# Patient Record
Sex: MALE
[De-identification: names, ages, dates, MRNs, and addresses within clinical notes are randomized per-mention and may not be internally consistent; named-entity substitution may affect disease eponyms.]

---

## 2018-01-10 ENCOUNTER — CHARTING TRANS (OUTPATIENT)
Dept: OTHER | Age: 33
End: 2018-01-10

## 2018-01-10 ENCOUNTER — LAB SERVICES (OUTPATIENT)
Dept: OTHER | Age: 33
End: 2018-01-10

## 2018-01-10 LAB — RAPID STREP GROUP A: NORMAL

## 2018-11-02 VITALS
DIASTOLIC BLOOD PRESSURE: 72 MMHG | SYSTOLIC BLOOD PRESSURE: 118 MMHG | HEART RATE: 98 BPM | TEMPERATURE: 98.1 F | RESPIRATION RATE: 18 BRPM

## 2024-07-21 ENCOUNTER — HOSPITAL ENCOUNTER (EMERGENCY)
Age: 39
Discharge: HOME | End: 2024-07-21
Attending: EMERGENCY MEDICINE
Payer: COMMERCIAL

## 2024-07-21 ENCOUNTER — APPOINTMENT (OUTPATIENT)
Dept: CT IMAGING | Age: 39
End: 2024-07-21
Attending: EMERGENCY MEDICINE
Payer: COMMERCIAL

## 2024-07-21 VITALS
HEART RATE: 106 BPM | RESPIRATION RATE: 18 BRPM | DIASTOLIC BLOOD PRESSURE: 99 MMHG | BODY MASS INDEX: 37.93 KG/M2 | OXYGEN SATURATION: 96 % | SYSTOLIC BLOOD PRESSURE: 138 MMHG | WEIGHT: 280 LBS | TEMPERATURE: 99 F | HEIGHT: 72 IN

## 2024-07-21 DIAGNOSIS — S09.90XA INJURY OF HEAD, INITIAL ENCOUNTER: ICD-10-CM

## 2024-07-21 DIAGNOSIS — J01.90 ACUTE SINUSITIS, RECURRENCE NOT SPECIFIED, UNSPECIFIED LOCATION: ICD-10-CM
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
## 2024-07-21 DIAGNOSIS — S01.01XA LACERATION OF SCALP, INITIAL ENCOUNTER: ICD-10-CM

## 2024-07-21 PROCEDURE — 99284 EMERGENCY DEPT VISIT MOD MDM: CPT

## 2024-07-21 PROCEDURE — 70450 CT HEAD/BRAIN W/O DYE: CPT | Performed by: EMERGENCY MEDICINE

## 2024-07-21 PROCEDURE — 93010 ELECTROCARDIOGRAM REPORT: CPT

## 2024-07-21 PROCEDURE — 12002 RPR S/N/AX/GEN/TRNK2.6-7.5CM: CPT

## 2024-07-21 PROCEDURE — 93005 ELECTROCARDIOGRAM TRACING: CPT

## 2024-07-21 RX ORDER — ACETAMINOPHEN 500 MG
1000 TABLET ORAL ONCE
Status: COMPLETED | OUTPATIENT
Start: 2024-07-21 | End: 2024-07-21

## 2024-07-21 RX ORDER — FLUTICASONE PROPIONATE 50 MCG
2 SPRAY, SUSPENSION (ML) NASAL DAILY
Qty: 16 G | Refills: 0 | Status: SHIPPED | OUTPATIENT
Start: 2024-07-21

## 2024-07-21 RX ORDER — GABAPENTIN 300 MG/1
300 CAPSULE ORAL 3 TIMES DAILY
COMMUNITY

## 2024-07-21 NOTE — ED INITIAL ASSESSMENT (HPI)
Patient to ER with c/o laceration to back of his head. Patient was walking up wooden stairs, got dizzy, missed a step and fell backwards hitting head against stair 30 minutes PTA. Denies any LOC, no N/V. No pain medications taken PTA. Patient with c/o chest pressure for the past two weeks intermittently with intermittent dizziness.

## 2024-07-21 NOTE — ED PROVIDER NOTES
Patient Seen in: [facility] In [place]      History     Chief Complaint   Patient presents with    Laceration/Abrasion     Stated Complaint: Laceration to back of head, fell walking up the stairs    Subjective:   39-year-old male presents emergency room after fall.  Patient reports he got lightheaded and dizzy and fell.  Patient struck the back of his head he did not lose consciousness he called EMS who wrapped the wound.  He did then refused transport.  Patient reports no blood thinners his GCS is 15 he has no focal deficits on exam.  The wound edges are present but no active bleeding.  He has approximately 3 cm wound on the posterior scalp.  Patient reports he is up-to-date with tetanus shot    The history is provided by the patient.           Objective:   Past Medical History:    NIYA (obstructive sleep apnea)    AHI-36    Sepsis (HCC)              Past Surgical History:   Procedure Laterality Date    Toe surgery Right                 Social History     Socioeconomic History    Marital status:    Tobacco Use    Smoking status: Never    Smokeless tobacco: Never   Vaping Use    Vaping status: Never Used   Substance and Sexual Activity    Alcohol use: Not Currently     Comment: occ    Drug use: Never     Social Determinants of Health      Received from [facility]    Social Connections    Received from [facility]              Review of Systems   Skin:  Positive for wound.   Neurological:  Positive for dizziness.       Positive for stated Chief Complaint: Laceration/Abrasion    Other systems are as noted in HPI.  Constitutional and vital signs reviewed.      All other systems reviewed and negative except as noted above.    Physical Exam     ED Triage Vitals [07/21/24 0316]   BP (!) 147/102   Pulse 116   Resp 18   Temp 98.6 °F (37 °C)   Temp src Oral   SpO2 95 %   O2 Device None (Room air)       Current Vitals:   Vital Signs  BP: (!) 147/102  Pulse:  116  Resp: 18  Temp: 98.6 °F (37 °C)  Temp src: Oral    Oxygen Therapy  SpO2: 95 %  O2 Device: None (Room air)            Physical Exam  Vitals and nursing note reviewed.   Constitutional:       General: He is not in acute distress.     Appearance: Normal appearance. He is obese. He is not toxic-appearing.   HENT:      Head: Normocephalic.      Comments: 3 cm scalp laceration no active bleeding  Eyes:      Extraocular Movements: Extraocular movements intact.      Pupils: Pupils are equal, round, and reactive to light.   Cardiovascular:      Rate and Rhythm: Regular rhythm. Tachycardia present.   Pulmonary:      Effort: Pulmonary effort is normal.   Abdominal:      General: There is no distension.      Palpations: Abdomen is soft.      Tenderness: There is no abdominal tenderness.   Musculoskeletal:         General: Normal range of motion.   Skin:     General: Skin is warm.      Capillary Refill: Capillary refill takes less than 2 seconds.      Comments: Scalp laceration no active bleeding   Neurological:      General: No focal deficit present.      Mental Status: He is alert and oriented to person, place, and time.   Psychiatric:         Mood and Affect: Mood normal.         Behavior: Behavior normal.               ED Course   Labs Reviewed - No data to display  EKG    Rate, intervals and axes as noted on EKG Report.  Rate: 112  Rhythm: Sinus Rhythm  Reading: Sinus tachycardia no ST elevation AL interval 184 QRS of 90 QTc of 455 with axes of 51/68/14         Laceration repair  The wound was not anesthetized, as patient declined anesthetic. The wound was irrigated with normal saline. The wound was prepped and draped in the normal sterile fashion.  The wound was explored for foreign bodies and none were found. The edges were reapproximated using 4 staples suture,    wound length was approximated at 3 cm.  Patient tolerated procedure well.  Bacitracin dressing was applied.                CT brain shows no priors no  acute intracranial hemorrhage midline shift or mass effect no acute fracture sinuses in the maxillary and sphenoid sinuses and ethmoid air cells bilaterally.  Subtotal ossification of the right maxillary sinus likely representing a mucous retention cyst.  Frontal sinuses appear normal.         MDM      Social -negative tobacco, negative etoh, negative drugs  Family History-noncontributory  Past Medical History-sleep apnea    Differential diagnosis before testing included head injury, laceration, intracranial hemorrhage, fracture, dizziness, sinus issues,    Co-morbidities that add to the complexity of management include: History of sleep apnea    Testing ordered during this visit included CT of the brain EKG    Radiographic images  I personally reviewed the radiographs and my individual interpretation shows no acute process  I also reviewed the official reports that showed CT brain shows no priors no acute intracranial hemorrhage midline shift or mass effect no acute fracture sinuses in the maxillary and sphenoid sinuses and ethmoid air cells bilaterally.  Subtotal ossification of the right maxillary sinus likely representing a mucous retention cyst.  Frontal sinuses appear normal.    External chart review showed review of care everywhere in epic system shows no related comorbidities to current presentation    History obtained by an independent source included from patient    Discussion of management with patient    Social determinants of health that affect care include no listed primary care physician      Medications Provided: Bacitracin, Tylenol    Course of Events during Emergency Room Visit include 39-year-old male with fall at home with head injury patient's scalp laceration injury.  He is CT scan shows no acute intracranial hemorrhage or fracture.  Bacitracin was placed he is to have stitches out in 5 to 7 days.  Is also was found to have sinusitis on imaging will recommend Flonase nasal spray and follow-up  with primary care physician          Disposition:      Discharge  I have discussed with the patient the results of test, differential diagnosis, treatment plan, warning signs and symptoms which should prompt immediate return.  They expressed understanding of these instructions and agrees to the following plan provided.  They were given written discharge instructions and agrees to return for any concerns and voiced understanding and all questions were answered.                                      Medical Decision Making      Disposition and Plan     Clinical Impression:  1. Fall, initial encounter    2. Injury of head, initial encounter    3. Laceration of scalp, initial encounter    4. Acute sinusitis, recurrence not specified, unspecified location         Disposition:  Discharge  7/21/2024  4:06 am    Follow-up:  Selena Powell MD  [address] [facility] [address]  [phone]    Schedule an appointment as soon as possible for a visit            Medications Prescribed:  Current Discharge Medication List        START taking these medications    Details   bacitracin 500 UNIT/GM External Ointment Apply 1 Application topically 2 (two) times daily for 10 days.  Qty: 15 g, Refills: 0      fluticasone propionate 50 MCG/ACT Nasal Suspension 2 sprays by Nasal route daily.  Qty: 16 g, Refills: 0

## 2024-07-21 NOTE — ED QUICK NOTES
Patient originally called 911 but refused transfer once EMS came to his house. Dressing to patient's head was placed by EMS.

## 2024-07-22 LAB
ATRIAL RATE: 112 BPM
P AXIS: 51 DEGREES
P-R INTERVAL: 184 MS
Q-T INTERVAL: 334 MS
QRS DURATION: 90 MS
QTC CALCULATION (BEZET): 455 MS
R AXIS: 68 DEGREES
T AXIS: 14 DEGREES
VENTRICULAR RATE: 112 BPM

## 2024-07-26 ENCOUNTER — HOSPITAL ENCOUNTER (EMERGENCY)
Age: 39
Discharge: HOME | End: 2024-07-26
Payer: COMMERCIAL

## 2024-07-26 VITALS
SYSTOLIC BLOOD PRESSURE: 118 MMHG | OXYGEN SATURATION: 95 % | TEMPERATURE: 99 F | BODY MASS INDEX: 37.93 KG/M2 | HEART RATE: 94 BPM | DIASTOLIC BLOOD PRESSURE: 78 MMHG | RESPIRATION RATE: 16 BRPM | WEIGHT: 280 LBS | HEIGHT: 72 IN

## 2024-07-26 DIAGNOSIS — S01.01XD LACERATION OF SCALP, SUBSEQUENT ENCOUNTER: Primary | ICD-10-CM

## 2024-07-26 DIAGNOSIS — Z48.02 ENCOUNTER FOR REMOVAL OF SUTURES: ICD-10-CM

## 2024-07-26 NOTE — ED PROVIDER NOTES
Patient Seen in: [facility] In [place]      History     Chief Complaint   Patient presents with    Sut Stap RingRemoval     Stated Complaint: staple removal on head.    Subjective:   HPI    CHIEF COMPLAINT: Staple removal     HISTORY OF PRESENT ILLNESS: Patient is a 39-year-old male presenting for staple removal.  He had 4 staples put in the posterior scalp 6 days ago.  States he has been feeling well.  No headaches, nausea, vomiting, dizziness or vision change since his visit here.  No fever or chills.     REVIEW OF SYSTEMS:  Constitutional: no fever, no chills  Eyes: no discharge  ENT: no sore throat  Cardiovascular: no chest pain, no palpitations  Respiratory: no cough, no shortness of breath  Gastrointestinal: no abdominal pain, no vomiting  Genitourinary: no hematuria  Musculoskeletal: no back pain  Skin: no rashes  Neurological: no headache     Otherwise a complete review of systems was obtained and other than the HPI was negative     The patient's medication list, past medical history and social history elements is as listed in today's nurse's notes are reviewed and agree. The patient's family history is reviewed and is noncontributory to the presenting problem, except as indicated as above.    Objective:   Past Medical History:    NIYA (obstructive sleep apnea)    AHI-36    Sepsis (HCC)              Past Surgical History:   Procedure Laterality Date    Toe surgery Right                 Social History     Socioeconomic History    Marital status:    Tobacco Use    Smoking status: Never    Smokeless tobacco: Never   Vaping Use    Vaping status: Never Used   Substance and Sexual Activity    Alcohol use: Not Currently     Comment: occ    Drug use: Never     Social Determinants of Health      Received from [facility]    Social Connections    Received from [facility]              Review of Systems    Positive for stated Chief Complaint: Sut Stap  RingRemoval    Other systems are as noted in HPI.  Constitutional and vital signs reviewed.      All other systems reviewed and negative except as noted above.    Physical Exam     ED Triage Vitals [07/26/24 1547]   /78   Pulse 94   Resp 16   Temp 98.5 °F (36.9 °C)   Temp src Temporal   SpO2 95 %   O2 Device None (Room air)       Current Vitals:   Vital Signs  BP: 118/78  Pulse: 94  Resp: 16  Temp: 98.5 °F (36.9 °C)  Temp src: Temporal    Oxygen Therapy  SpO2: 95 %  O2 Device: None (Room air)            Physical Exam    Vital signs and nursing notes reviewed  General Appearance: No acute distress  Neurological:  A&Ox3,  Gait normal.  Psychiatric: calm and cooperative  Respiratory: Normal effort  Musculoskeletal: Extremities are symmetrical, full range of motion  Skin:  warm and dry, no rashes.   Posterior scalp there is a well-healed linear laceration with 4 staples in place.  No surrounding warmth, erythema, induration.  No active bleeding.  4 staples removed without difficulty.  Patient tolerated well.    ED Course   Labs Reviewed - No data to display                   MDM      This is a well-appearing 39-year-old male presenting for staple removal.  4 staples removed from the posterior scalp without difficulty.  Patient tolerated well.  Follow-up as needed.  He voiced understanding the treatment plan.  All questions answered                                   [facility]    Disposition and Plan     Clinical Impression:  1. Laceration of scalp, subsequent encounter    2. Encounter for removal of sutures         Disposition:  Discharge  7/26/2024  4:20 pm    Follow-up:  Your primary care provider    Follow up  As needed          Medications Prescribed:  Discharge Medication List as of 7/26/2024  4:21 PM

## 2025-04-09 RX ORDER — MONTELUKAST SODIUM 10 MG/1
TABLET ORAL
COMMUNITY

## 2025-04-09 RX ORDER — ALBUTEROL SULFATE 0.83 MG/ML
SOLUTION RESPIRATORY (INHALATION)
COMMUNITY

## 2025-04-10 ENCOUNTER — OFFICE VISIT (OUTPATIENT)
Dept: FAMILY MEDICINE CLINIC | Facility: CLINIC | Age: 40
End: 2025-04-10
Payer: COMMERCIAL

## 2025-04-10 ENCOUNTER — HOSPITAL ENCOUNTER (OUTPATIENT)
Dept: GENERAL RADIOLOGY | Age: 40
Discharge: HOME | End: 2025-04-10
Attending: FAMILY MEDICINE
Payer: COMMERCIAL

## 2025-04-10 VITALS
BODY MASS INDEX: 39.14 KG/M2 | DIASTOLIC BLOOD PRESSURE: 76 MMHG | RESPIRATION RATE: 18 BRPM | WEIGHT: 289 LBS | SYSTOLIC BLOOD PRESSURE: 118 MMHG | OXYGEN SATURATION: 94 % | HEIGHT: 72 IN | HEART RATE: 110 BPM

## 2025-04-10 DIAGNOSIS — M79.672 BILATERAL FOOT PAIN: ICD-10-CM

## 2025-04-10 DIAGNOSIS — Z80.9 FAMILY HISTORY OF CANCER: ICD-10-CM

## 2025-04-10 DIAGNOSIS — Z00.00 LABORATORY EXAM ORDERED AS PART OF ROUTINE GENERAL MEDICAL EXAMINATION: ICD-10-CM

## 2025-04-10 DIAGNOSIS — G47.33 OSA (OBSTRUCTIVE SLEEP APNEA): ICD-10-CM

## 2025-04-10 DIAGNOSIS — M25.50 ARTHRALGIA, UNSPECIFIED JOINT: ICD-10-CM

## 2025-04-10 DIAGNOSIS — M79.671 BILATERAL FOOT PAIN: ICD-10-CM

## 2025-04-10 DIAGNOSIS — G57.12 MERALGIA PARAESTHETICA, LEFT: Primary | ICD-10-CM

## 2025-04-10 DIAGNOSIS — M51.369 DEGENERATION OF INTERVERTEBRAL DISC OF LUMBAR REGION, UNSPECIFIED WHETHER PAIN PRESENT: ICD-10-CM

## 2025-04-10 DIAGNOSIS — R53.83 FATIGUE, UNSPECIFIED TYPE: ICD-10-CM

## 2025-04-10 PROCEDURE — 3078F DIAST BP <80 MM HG: CPT | Performed by: FAMILY MEDICINE

## 2025-04-10 PROCEDURE — 3074F SYST BP LT 130 MM HG: CPT | Performed by: FAMILY MEDICINE

## 2025-04-10 PROCEDURE — 73630 X-RAY EXAM OF FOOT: CPT | Performed by: FAMILY MEDICINE

## 2025-04-10 PROCEDURE — 3008F BODY MASS INDEX DOCD: CPT | Performed by: FAMILY MEDICINE

## 2025-04-10 PROCEDURE — 99204 OFFICE O/P NEW MOD 45 MIN: CPT | Performed by: FAMILY MEDICINE

## 2025-04-10 RX ORDER — PREGABALIN 75 MG/1
75 CAPSULE ORAL 2 TIMES DAILY
Qty: 60 CAPSULE | Refills: 2 | Status: SHIPPED | OUTPATIENT
Start: 2025-04-10

## 2025-04-10 NOTE — PROGRESS NOTES
The following individual(s) verbally consented to be recorded using ambient AI listening technology and understand that they can each withdraw their consent to this listening technology at any point by asking the clinician to turn off or pause the recording:    Patient name: Paddy Piña Jr.  Additional names:
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
## 2025-04-11 ENCOUNTER — LAB ENCOUNTER (OUTPATIENT)
Dept: LAB | Age: 40
End: 2025-04-11
Attending: FAMILY MEDICINE
Payer: COMMERCIAL

## 2025-04-11 DIAGNOSIS — Z00.00 LABORATORY EXAM ORDERED AS PART OF ROUTINE GENERAL MEDICAL EXAMINATION: ICD-10-CM

## 2025-04-11 DIAGNOSIS — R53.83 FATIGUE, UNSPECIFIED TYPE: ICD-10-CM

## 2025-04-11 DIAGNOSIS — M25.50 ARTHRALGIA, UNSPECIFIED JOINT: ICD-10-CM

## 2025-04-11 LAB
CHOLEST SERPL-MCNC: 172 MG/DL (ref ?–200)
CRP SERPL-MCNC: <0.4 MG/DL (ref ?–0.5)
ERYTHROCYTE [SEDIMENTATION RATE] IN BLOOD: 7 MM/HR (ref 0–15)
FASTING PATIENT LIPID ANSWER: YES
HDLC SERPL-MCNC: 42 MG/DL (ref 40–59)
LDLC SERPL CALC-MCNC: 104 MG/DL (ref ?–100)
NONHDLC SERPL-MCNC: 130 MG/DL (ref ?–130)
RHEUMATOID FACT SERPL-ACNC: <3.5 IU/ML (ref ?–14)
TRIGL SERPL-MCNC: 148 MG/DL (ref 30–149)
TSI SER-ACNC: 1.94 UIU/ML (ref 0.55–4.78)
URATE SERPL-MCNC: 5.4 MG/DL (ref 3.7–9.2)
VIT B12 SERPL-MCNC: 362 PG/ML (ref 211–911)
VIT D+METAB SERPL-MCNC: 8.8 NG/ML (ref 30–100)
VLDLC SERPL CALC-MCNC: 25 MG/DL (ref 0–30)

## 2025-04-11 PROCEDURE — 85652 RBC SED RATE AUTOMATED: CPT

## 2025-04-11 PROCEDURE — 82306 VITAMIN D 25 HYDROXY: CPT

## 2025-04-11 PROCEDURE — 84550 ASSAY OF BLOOD/URIC ACID: CPT

## 2025-04-11 PROCEDURE — 86200 CCP ANTIBODY: CPT

## 2025-04-11 PROCEDURE — 84443 ASSAY THYROID STIM HORMONE: CPT

## 2025-04-11 PROCEDURE — 86225 DNA ANTIBODY NATIVE: CPT

## 2025-04-11 PROCEDURE — 36415 COLL VENOUS BLD VENIPUNCTURE: CPT

## 2025-04-11 PROCEDURE — 86140 C-REACTIVE PROTEIN: CPT

## 2025-04-11 PROCEDURE — 84403 ASSAY OF TOTAL TESTOSTERONE: CPT

## 2025-04-11 PROCEDURE — 86038 ANTINUCLEAR ANTIBODIES: CPT

## 2025-04-11 PROCEDURE — 84402 ASSAY OF FREE TESTOSTERONE: CPT

## 2025-04-11 PROCEDURE — 80061 LIPID PANEL: CPT

## 2025-04-11 PROCEDURE — 86431 RHEUMATOID FACTOR QUANT: CPT

## 2025-04-11 PROCEDURE — 82607 VITAMIN B-12: CPT

## 2025-04-14 LAB
CCP IGG SERPL-ACNC: 1.2 U/ML (ref 0–6.9)
DSDNA IGG SERPL IA-ACNC: 0.7 IU/ML (ref ?–10)
ENA AB SER QL IA: 0.3 UG/L (ref ?–0.7)
ENA AB SER QL IA: NEGATIVE

## 2025-04-15 DIAGNOSIS — E55.9 VITAMIN D DEFICIENCY: Primary | ICD-10-CM

## 2025-04-15 DIAGNOSIS — R53.83 FATIGUE, UNSPECIFIED TYPE: ICD-10-CM

## 2025-04-15 RX ORDER — ERGOCALCIFEROL 1.25 MG/1
50000 CAPSULE, LIQUID FILLED ORAL WEEKLY
Qty: 4 CAPSULE | Refills: 1 | Status: SHIPPED | OUTPATIENT
Start: 2025-04-15 | End: 2025-06-10

## 2025-04-16 LAB
FREE TESTOST DIRECT: 10.3 PG/ML
TESTOSTERONE: 384 NG/DL

## 2025-05-07 ENCOUNTER — HOSPITAL ENCOUNTER (EMERGENCY)
Age: 40
Discharge: HOME | End: 2025-05-07
Payer: COMMERCIAL

## 2025-05-07 VITALS
HEIGHT: 72 IN | WEIGHT: 280 LBS | BODY MASS INDEX: 37.93 KG/M2 | RESPIRATION RATE: 18 BRPM | DIASTOLIC BLOOD PRESSURE: 88 MMHG | TEMPERATURE: 98 F | HEART RATE: 84 BPM | OXYGEN SATURATION: 96 % | SYSTOLIC BLOOD PRESSURE: 116 MMHG

## 2025-05-07 DIAGNOSIS — H43.391 VITREOUS FLOATERS OF RIGHT EYE: Primary | ICD-10-CM

## 2025-05-07 PROCEDURE — 99283 EMERGENCY DEPT VISIT LOW MDM: CPT

## 2025-05-07 NOTE — ED PROVIDER NOTES
Patient Seen in: [facility] In [place]      History     Chief Complaint   Patient presents with    Eye Visual Problem     Pt endorses R eye blurry vision and floaters since yesterday, denies injury, denies any other symptoms.      Stated Complaint: Blurry vision    Subjective:   HPI    Patient complains of black \"spider web\" in his right field of vision since yesterday that has been persistent.  States he initially assumed it was related to his contacts so he removed them with no improvement.  Denies eye pain, drainage from eye or any injury/trauma.  Denies flashes of light or loss of vision.  Denies any other complaints/concerns at this time .      History of Present Illness               Objective:     Past Medical History:    NIYA (obstructive sleep apnea)    AHI-36    Sepsis (HCC)              Past Surgical History:   Procedure Laterality Date    Toe surgery Right                 Social History     Socioeconomic History    Marital status:    Tobacco Use    Smoking status: Never    Smokeless tobacco: Never   Vaping Use    Vaping status: Never Used   Substance and Sexual Activity    Alcohol use: Never    Drug use: Never     Social Drivers of Health     Food Insecurity: No Food Insecurity (4/10/2025)    NCSS - Food Insecurity     Worried About Running Out of Food in the Last Year: No     Ran Out of Food in the Last Year: No   Transportation Needs: No Transportation Needs (4/10/2025)    NCSS - Transportation     Lack of Transportation: No   Housing Stability: Not At Risk (4/10/2025)    NCSS - Housing/Utilities     Has Housing: Yes     Worried About Losing Housing: No     Unable to Get Utilities: No                                Physical Exam     ED Triage Vitals [05/07/25 0940]   /88   Pulse 84   Resp 18   Temp 98.4 °F (36.9 °C)   Temp src Oral   SpO2 96 %   O2 Device None (Room air)       Current Vitals:   Vital Signs  BP: 116/88  Pulse: 84  Resp: 18  Temp: 98.4 °F (36.9 °C)  Temp  src: Oral    Oxygen Therapy  SpO2: 96 %  O2 Device: None (Room air)      Right Eye Chart Acuity: 20/25, Corrected  Left Eye Chart Acuity: 20/25, Corrected  Physical Exam  Vitals and nursing note reviewed.   Constitutional:       Appearance: Normal appearance.   HENT:      Head: Normocephalic.   Eyes:      General: Lids are everted, no foreign bodies appreciated. Vision grossly intact.      Extraocular Movements: Extraocular movements intact.      Conjunctiva/sclera: Conjunctivae normal.      Right eye: Right conjunctiva is not injected. No chemosis, exudate or hemorrhage.     Pupils: Pupils are equal, round, and reactive to light.      Right eye: No corneal abrasion or fluorescein uptake. Doreen exam negative.      Funduscopic exam:     Right eye: Red reflex present.         Left eye: Red reflex present.     Comments: IOP: 20   Pulmonary:      Effort: Pulmonary effort is normal.   Musculoskeletal:         General: Normal range of motion.   Skin:     General: Skin is warm and dry.   Neurological:      General: No focal deficit present.      Mental Status: He is alert.           Physical Exam                ED Course   Labs Reviewed - No data to display       Results                           MDM      Differential diagnosis includes but is not limited to floaters, retinal detachment, glaucoma, corneal abrasion.    Patient well-appearing, non-toxic. Visual acuity WNL.  Red light reflex intact on exam and no reports of visual field loss.  The eye was stained and there was no fluoroscein uptake.  IOP measured and was 20.  Patient has no associated eye pain.  Discussed differential diagnosis with patient and likely this is benign but advised close follow-up with ophthalmology, referral provided.  I advised he call them today.  Provided return precautions.  Patient verbalized understanding/agreement of plan.         Medical Decision Making      Disposition and Plan     Clinical Impression:  1. Vitreous floaters of right  eye         Disposition:  Discharge  5/7/2025 10:21 am    Follow-up:  Bert Bojorquez MD  [address] [address]  [phone]    Call today            Medications Prescribed:  Discharge Medication List as of 5/7/2025 10:22 AM          Supplementary Documentation:

## 2025-05-07 NOTE — ED INITIAL ASSESSMENT (HPI)
Pt endorses R eye blurry vision and floaters since yesterday, denies injury, denies any other symptoms.

## 2025-05-14 ENCOUNTER — TELEPHONE (OUTPATIENT)
Facility: CLINIC | Age: 40
End: 2025-05-14

## 2025-05-14 ENCOUNTER — TELEPHONE (OUTPATIENT)
Dept: PHYSICAL THERAPY | Facility: HOSPITAL | Age: 40
End: 2025-05-14

## 2025-05-14 ENCOUNTER — OFFICE VISIT (OUTPATIENT)
Facility: LOCATION | Age: 40
End: 2025-05-14
Payer: COMMERCIAL

## 2025-05-14 ENCOUNTER — OFFICE VISIT (OUTPATIENT)
Dept: PHYSICAL THERAPY | Age: 40
End: 2025-05-14
Attending: FAMILY MEDICINE
Payer: COMMERCIAL

## 2025-05-14 DIAGNOSIS — M20.5X1 HALLUX LIMITUS OF RIGHT FOOT: Primary | ICD-10-CM

## 2025-05-14 DIAGNOSIS — M25.561 RIGHT KNEE PAIN, UNSPECIFIED CHRONICITY: Primary | ICD-10-CM

## 2025-05-14 DIAGNOSIS — M79.672 LEFT FOOT PAIN: ICD-10-CM

## 2025-05-14 DIAGNOSIS — G57.12 MERALGIA PARAESTHETICA, LEFT: Primary | ICD-10-CM

## 2025-05-14 DIAGNOSIS — M19.071 ARTHRITIS OF RIGHT SUBTALAR JOINT: ICD-10-CM

## 2025-05-14 DIAGNOSIS — M20.5X2 HALLUX LIMITUS OF LEFT FOOT: ICD-10-CM

## 2025-05-14 DIAGNOSIS — M25.571 SINUS TARSITIS OF RIGHT FOOT: ICD-10-CM

## 2025-05-14 DIAGNOSIS — M19.071 ARTHRITIS OF RIGHT MIDFOOT: ICD-10-CM

## 2025-05-14 DIAGNOSIS — M79.671 RIGHT FOOT PAIN: ICD-10-CM

## 2025-05-14 DIAGNOSIS — Z89.421 HISTORY OF AMPUTATION OF LESSER TOE OF RIGHT FOOT (HCC): ICD-10-CM

## 2025-05-14 PROCEDURE — 97161 PT EVAL LOW COMPLEX 20 MIN: CPT

## 2025-05-14 PROCEDURE — 97110 THERAPEUTIC EXERCISES: CPT

## 2025-05-14 PROCEDURE — 99203 OFFICE O/P NEW LOW 30 MIN: CPT | Performed by: PODIATRIST

## 2025-05-14 RX ORDER — METHYLPREDNISOLONE 4 MG/1
TABLET ORAL
Qty: 21 TABLET | Refills: 0 | Status: SHIPPED | OUTPATIENT
Start: 2025-05-14

## 2025-05-14 NOTE — TELEPHONE ENCOUNTER
Future Appointments   Date Time Provider Department Center   5/14/2025  3:00 PM Furto, Raphael, PT [facility]   5/19/2025  5:15 PM Furto, Raphael, PT [facility]   5/20/2025  9:30 AM Carmelo Masterson MD [facility] [address]   5/21/2025  5:15 PM Furto, Raphael, PT [facility] [place]   5/22/2025  8:40 AM Bear Gray MD [facility]   5/27/2025  2:15 PM Furto, Raphael, PT [facility] [place]   5/30/2025  1:30 PM Furto, Raphael, PT [facility] [place]   6/2/2025  6:00 PM Furto, Raphael, PT [facility] [place]   6/4/2025  9:30 AM Maria Guadalupe Lezama PF [facility]   6/4/2025 10:50 AM PF OOT PF [facility]   6/6/2025  9:15 AM Darius Gutierrez DPM [id number] [facility]   6/20/2025 10:00 PM SCHEDULE BY DATE AMADOU Kevin     Please advise if patient needs right knee xrays.  Patient already knows to come early,so no need to call him.

## 2025-05-14 NOTE — PROGRESS NOTES
[facility]  Progress Note      Paddy Piña Jr. is a 40 year old male.   Chief Complaint   Patient presents with    Foot Pain     Consult bilateral foot pain 4/10, right foot is worse than left. Hx of right 2nd toe bony amputation and recently Ddiagnose with bilateral osteoarthritis at 1st. Has XR in epic.         HPI:     The following individual(s) verbally consented to be recorded using ambient AI listening technology and understand that they can each withdraw their consent to this listening technology at any point by asking the clinician to turn off or pause the recording:      History of Present Illness  Paddy Piña Jr. is a 40 year old male with a history of right 2nd toe amputation who presents with foot pain and suspected arthritis.    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 The surgery was complicated by sepsis, affecting his recovery. He now experiences persistent pain in the joints of both great toes, with a sensation of cracking, particularly exacerbated by driving and weight-bearing activities.  Patient also experiences overall pain to his feet, particularly the right foot.    The pain is primarily localized to the midfoot of the right foot and does not radiate. It is significantly aggravated by activities such as transitioning from sitting to standing and playing with his daughter, driving, and is impacting his daily life. Currently, he is not taking any medication for the pain.    His occupation as a  involves extensive driving, which he believes contributes to the pain in his right foot. He regularly drives long distances, which exacerbates the discomfort.    He has a history of playing sports such as football and wrestling, which may have contributed to joint issues. Although he reports no known injuries from these activities, he acknowledges the possibility of past  joint damage.  Patient also shares that he is having right knee issues and is inquiring any recommendations on physicians to see for this.      Allergies: Dog epithelium and Molds & smuts   Current Medications[1]   Past Medical History[2]   Past Surgical History[3]   Family History[4]   Social Hx on file[5]        REVIEW OF SYSTEMS:     10 point ROS completed and was negative unless stated in HPI.      EXAM:     GENERAL: well developed, well nourished, in no apparent distress  EXTREMITIES:  1. Integument: Skin appears moist, warm, and supple. There are no color changes. No open lesions. No macerations. No Hyperkeratotic lesions.  Healed cicatrix with syndactylization of remaining right second digit tissue to the right third digit.  No signs of infection or complication.  2. Vascular: Dorsalis pedis 2/4 bilateral and posterior tibial pulses 2/4 bilateral, capillary refill normal.  3. Neurological: Gross sensation intact via light touch bilaterally.  Normal sharp/dull sensation  4. Musculoskeletal: History of right second digit amputation with soft tissue envelope still within the foot, some dactylitis to the right third digit.  Patient does have decreased range of motion of bilateral first MPJs with pain at end range of dorsiflexion.  Moderate pain with palpation to second tarsometatarsal joint with palpable exostosis present, right foot.  Patient does have adequate inversion of subtalar joint of the right foot, but limitations with eversion and pain elicited.  There is also pain with palpation within the sinus tarsi of the right foot.  No pain with palpation to tarsometatarsal joints of the left foot and no pain to the subtalar joint or left foot.    XR FOOT, COMPLETE (MIN 3 VIEWS), BILAT (CPT=73630-50)  Result Date: 4/10/2025  CONCLUSION:  1. Prior bony amputation right 2nd digit. 2. Bilateral osteoarthritis at 1st metatarsophalangeal joints.   LOCATION:  [facility]   Dictated by (CST): Chivo Salinas MD on 4/10/2025 at  3:50 PM     Finalized by (CST): Chivo Salinas MD on 4/10/2025 at 3:52 PM          ASSESSMENT AND PLAN:   Diagnoses and all orders for this visit:    Hallux limitus of right foot    Hallux limitus of left foot    Arthritis of right midfoot    Arthritis of right subtalar joint    Sinus tarsitis of right foot    Right foot pain    Left foot pain    History of amputation of lesser toe of right foot (HCC)        Plan:   -Patient was seen and evaluated today in clinic.  Chart history reviewed.    Assessment & Plan  Arthritis in feet and right knee  Chronic foot pain with mild arthritic changes in midfoot and subtalar joint, exacerbated by driving and weight-bearing. Discussed supportive footwear and inserts. Opted for oral steroids over injections. Potential for worsening arthritis due to occupational demands.  - Recommend over-the-counter shoe inserts.  - Advise wearing supportive footwear such as Castillo, New Balance, or Asics.  - Prescribe Medrol Dosepak.  Discussed injection into second tarsometatarsal joint of the right foot today, but he did defer.  - Will rediscuss localized injection pending improvements at next visit   - Consider meloxicam if symptoms persist after steroid treatment.  - Discuss potential need for advanced imaging if conservative measures fail.  - Educate on possibility of surgical intervention if arthritis progresses.  - Provide information on Mix's extension for support.    -All of the patient's questions and concerns were addressed.  They indicated their understanding of these issues and agrees to the plan.    Time spent reviewing pertinent information from patient's chart, reviewing any pertinent imaging, obtaining history and physical exam, discussing and mutually agreeing on a treatment plan, and documenting encounter: 35 minutes    RTC 3 weeks    Mich Gutierrez DPM, AACFAS        5/14/2025    [facility]  [address] [place] [id number]    [email or web address]            Dragon speech recognition software was used to prepare this note.  Errors in word recognition may occur.  Please contact me with any questions/concerns with this note.        [1]   Current Outpatient Medications   Medication Sig Dispense Refill    ergocalciferol 1.25 MG (73588 UT) Oral Cap Take 1 capsule (50,000 Units total) by mouth once a week. 4 capsule 1    pregabalin 75 MG Oral Cap Take 1 capsule (75 mg total) by mouth 2 (two) times daily. 60 capsule 2    albuterol (2.5 MG/3ML) 0.083% Inhalation Nebu Soln  (Patient not taking: Reported on 5/14/2025)      fluticasone propionate 50 MCG/ACT Nasal Suspension 2 sprays by Nasal route daily. (Patient not taking: Reported on 5/14/2025) 16 g 0   [2]   Past Medical History:   NIYA (obstructive sleep apnea)    AHI-36    Sepsis (HCC)   [3]   Past Surgical History:  Procedure Laterality Date    Toe surgery Right    [4]   Family History  Problem Relation Age of Onset    Cancer Paternal Grandmother     Cancer Paternal Grandfather    [5]   Social History  Socioeconomic History    Marital status:    Tobacco Use    Smoking status: Never    Smokeless tobacco: Never   Vaping Use    Vaping status: Never Used   Substance and Sexual Activity    Alcohol use: Never    Drug use: Never
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
## 2025-05-19 ENCOUNTER — OFFICE VISIT (OUTPATIENT)
Dept: PHYSICAL THERAPY | Age: 40
End: 2025-05-19
Attending: FAMILY MEDICINE
Payer: COMMERCIAL

## 2025-05-19 PROCEDURE — 97110 THERAPEUTIC EXERCISES: CPT

## 2025-05-19 PROCEDURE — 97140 MANUAL THERAPY 1/> REGIONS: CPT

## 2025-05-19 NOTE — PROGRESS NOTES
Patient: Paddy Piña  (40 year old, male) Referring Provider:  Insurance:   Diagnosis:   Bear Gray  BCBS OUT OF STATE   Date of Surgery: No data recorded Next MD visit:  N/A   Precautions:  None No data recorded Referral Information:    Date of Evaluation: Req: 0, Auth: 0, Exp:     No data recorded POC Auth Visits:          Today's Date   5/19/2025    Subjective  States that the symptoms are similar - lateral left thigh paraesthesia.  States that he wasn't driving as much, so the symptoms should be lessened.       Pain: 3/10     Objective  Treatment initiated per log below         Assessment  Responded well with increased mobility of the bilateral hips    Goals (to be met in  )   1. Improve upon GERALDINE assessment > 11% from INE to DC.  2. Patient will be aware of postural limitations and be able to correct them independently.    3. Patient will have an increase in spinal mobility to >75% in order to return to improved tolerance for sitting, sustained ambulation, and exercise.    4. Patient will have an increase in core strength to assist with returning to sustained posturing.   5. Patient will demonstrate an increase in MLT of  the hamstrings and hip flexor in order to return to more normalized ambulation and exercise.           Plan  Assess response and progress as tolerated.    Treatment Last 4 Visits  Treatment Day:           HEP       Charges

## 2025-05-19 NOTE — PROGRESS NOTES
Patient: Paddy Piña  (40 year old, male) Referring Provider:  Insurance:   Diagnosis: Left Sided L3 Lumbar Paraesthesia Bear Gray  BCBS OUT OF STATE   Date of Surgery: No data recorded Next MD visit:  N/A   Precautions:  None No data recorded Referral Information:    Date of Evaluation: Req: 0, Auth: 0, Exp:     05/15/25 POC Auth Visits:          Today's Date   5/19/2025    Subjective  States that the symptoms are similar - lateral left thigh paraesthesia.  States that he wasn't driving as much, so the symptoms should be lessened.       Pain: 3/10     Objective  Treatment initiated per log below         Assessment  Responded well with increased mobility of the bilateral hips    Goals (to be met in 12 visits)   1. Improve upon GERALDINE assessment > 11% from INE to DC.  2. Patient will be aware of postural limitations and be able to correct them independently.    3. Patient will have an increase in spinal mobility to >75% in order to return to improved tolerance for sitting, sustained ambulation, and exercise.    4. Patient will have an increase in core strength to assist with returning to sustained posturing.   5. Patient will demonstrate an increase in MLT of  the hamstrings and hip flexor in order to return to more normalized ambulation and exercise.           Plan  Assess response and progress as tolerated.    Treatment Last 4 Visits  Treatment Day: 2       5/14/2025 5/19/2025   Spine Treatment   Therapeutic Exercise  Elliptical L5 x 5 min  KTOS 30 sec x 3  Open book stretch 10 sec x 3  Closed chain hamstring stretch x 10  Closed chain hip flexor stretch 10 sec x 10  Cable Column bilateral shoulder extension x 20 60#  Cable column unilateral rows 36# x 20  Cable column side stepping gait 36# x 10   Neuro Re-Education  TrA recruitment in neutral - rhythmic stabilization x 20 reps in neutral for flexion and bilateral rotation   Manual Therapy  Hip IR and Flexion manipulation,   Lumbar Roll manipulation  - left side     Therapeutic Exercise Minutes 10 25   Manual Therapy Minutes  15   Evaluation Minutes 25    Total Time Of Timed Procedures 10 40   Total Time Of Service-Based Procedures 25 0   Total Treatment Time 35 40   HEP LE flexibility and postural awareness         HEP  LE flexibility and postural awareness    Charges  2 TherEx; 1 Manual PT

## 2025-05-20 ENCOUNTER — HOSPITAL ENCOUNTER (OUTPATIENT)
Dept: GENERAL RADIOLOGY | Age: 40
Discharge: HOME | End: 2025-05-20
Attending: STUDENT IN AN ORGANIZED HEALTH CARE EDUCATION/TRAINING PROGRAM
Payer: COMMERCIAL

## 2025-05-20 ENCOUNTER — OFFICE VISIT (OUTPATIENT)
Facility: CLINIC | Age: 40
End: 2025-05-20
Payer: COMMERCIAL

## 2025-05-20 VITALS — WEIGHT: 285 LBS | BODY MASS INDEX: 38.6 KG/M2 | HEIGHT: 72 IN

## 2025-05-20 DIAGNOSIS — M17.11 PRIMARY OSTEOARTHRITIS OF RIGHT KNEE: Primary | ICD-10-CM

## 2025-05-20 DIAGNOSIS — M76.31 ILIOTIBIAL BAND SYNDROME OF RIGHT SIDE: ICD-10-CM

## 2025-05-20 DIAGNOSIS — M25.561 RIGHT KNEE PAIN, UNSPECIFIED CHRONICITY: ICD-10-CM

## 2025-05-20 DIAGNOSIS — M76.51 PATELLAR TENDINITIS OF RIGHT KNEE: ICD-10-CM

## 2025-05-20 PROCEDURE — 99203 OFFICE O/P NEW LOW 30 MIN: CPT | Performed by: STUDENT IN AN ORGANIZED HEALTH CARE EDUCATION/TRAINING PROGRAM

## 2025-05-20 PROCEDURE — 73564 X-RAY EXAM KNEE 4 OR MORE: CPT | Performed by: STUDENT IN AN ORGANIZED HEALTH CARE EDUCATION/TRAINING PROGRAM

## 2025-05-20 PROCEDURE — 3008F BODY MASS INDEX DOCD: CPT | Performed by: STUDENT IN AN ORGANIZED HEALTH CARE EDUCATION/TRAINING PROGRAM

## 2025-05-20 RX ORDER — BENZONATATE 100 MG/1
CAPSULE ORAL
COMMUNITY
End: 2025-05-22
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
## 2025-05-21 ENCOUNTER — APPOINTMENT (OUTPATIENT)
Dept: PHYSICAL THERAPY | Age: 40
End: 2025-05-21
Attending: FAMILY MEDICINE
Payer: COMMERCIAL

## 2025-05-21 ENCOUNTER — TELEPHONE (OUTPATIENT)
Dept: PHYSICAL THERAPY | Facility: HOSPITAL | Age: 40
End: 2025-05-21

## 2025-05-22 ENCOUNTER — LAB ENCOUNTER (OUTPATIENT)
Dept: LAB | Age: 40
End: 2025-05-22
Attending: FAMILY MEDICINE
Payer: COMMERCIAL

## 2025-05-22 ENCOUNTER — OFFICE VISIT (OUTPATIENT)
Dept: FAMILY MEDICINE CLINIC | Facility: CLINIC | Age: 40
End: 2025-05-22
Payer: COMMERCIAL

## 2025-05-22 VITALS
OXYGEN SATURATION: 96 % | BODY MASS INDEX: 38.6 KG/M2 | RESPIRATION RATE: 16 BRPM | HEIGHT: 72 IN | HEART RATE: 90 BPM | SYSTOLIC BLOOD PRESSURE: 122 MMHG | DIASTOLIC BLOOD PRESSURE: 64 MMHG | WEIGHT: 285 LBS

## 2025-05-22 DIAGNOSIS — J45.20 MILD INTERMITTENT EXTRINSIC ASTHMA WITHOUT COMPLICATION (HCC): ICD-10-CM

## 2025-05-22 DIAGNOSIS — Z00.00 LABORATORY EXAM ORDERED AS PART OF ROUTINE GENERAL MEDICAL EXAMINATION: ICD-10-CM

## 2025-05-22 DIAGNOSIS — E66.01 CLASS 2 SEVERE OBESITY DUE TO EXCESS CALORIES WITH SERIOUS COMORBIDITY AND BODY MASS INDEX (BMI) OF 38.0 TO 38.9 IN ADULT (HCC): ICD-10-CM

## 2025-05-22 DIAGNOSIS — Z00.00 WELLNESS EXAMINATION: Primary | ICD-10-CM

## 2025-05-22 DIAGNOSIS — E66.812 CLASS 2 SEVERE OBESITY DUE TO EXCESS CALORIES WITH SERIOUS COMORBIDITY AND BODY MASS INDEX (BMI) OF 38.0 TO 38.9 IN ADULT (HCC): ICD-10-CM

## 2025-05-22 DIAGNOSIS — G47.33 OSA (OBSTRUCTIVE SLEEP APNEA): ICD-10-CM

## 2025-05-22 DIAGNOSIS — J32.9 CHRONIC SINUSITIS, UNSPECIFIED LOCATION: ICD-10-CM

## 2025-05-22 PROBLEM — J45.909 EXTRINSIC ASTHMA (HCC): Status: ACTIVE | Noted: 2025-05-22

## 2025-05-22 PROBLEM — E78.2 MIXED HYPERLIPIDEMIA: Status: ACTIVE | Noted: 2025-05-22

## 2025-05-22 LAB
ALBUMIN SERPL-MCNC: 5 G/DL (ref 3.2–4.8)
ALBUMIN/GLOB SERPL: 1.8 {RATIO} (ref 1–2)
ALP LIVER SERPL-CCNC: 65 U/L (ref 45–117)
ALT SERPL-CCNC: 61 U/L (ref 10–49)
ANION GAP SERPL CALC-SCNC: 9 MMOL/L (ref 0–18)
AST SERPL-CCNC: 35 U/L (ref ?–34)
BASOPHILS # BLD AUTO: 0.05 X10(3) UL (ref 0–0.2)
BASOPHILS NFR BLD AUTO: 0.8 %
BILIRUB SERPL-MCNC: 0.6 MG/DL (ref 0.3–1.2)
BUN BLD-MCNC: 14 MG/DL (ref 9–23)
CALCIUM BLD-MCNC: 9.8 MG/DL (ref 8.7–10.6)
CHLORIDE SERPL-SCNC: 104 MMOL/L (ref 98–112)
CO2 SERPL-SCNC: 26 MMOL/L (ref 21–32)
CREAT BLD-MCNC: 1.01 MG/DL (ref 0.7–1.3)
EGFRCR SERPLBLD CKD-EPI 2021: 95 ML/MIN/1.73M2 (ref 60–?)
EOSINOPHIL # BLD AUTO: 0.29 X10(3) UL (ref 0–0.7)
EOSINOPHIL NFR BLD AUTO: 4.4 %
ERYTHROCYTE [DISTWIDTH] IN BLOOD BY AUTOMATED COUNT: 12.9 %
FASTING STATUS PATIENT QL REPORTED: YES
GLOBULIN PLAS-MCNC: 2.8 G/DL (ref 2–3.5)
GLUCOSE BLD-MCNC: 145 MG/DL (ref 70–99)
HCT VFR BLD AUTO: 46.1 % (ref 39–53)
HGB BLD-MCNC: 15.8 G/DL (ref 13–17.5)
IMM GRANULOCYTES # BLD AUTO: 0.01 X10(3) UL (ref 0–1)
IMM GRANULOCYTES NFR BLD: 0.2 %
LYMPHOCYTES # BLD AUTO: 1.83 X10(3) UL (ref 1–4)
LYMPHOCYTES NFR BLD AUTO: 27.8 %
MCH RBC QN AUTO: 31.1 PG (ref 26–34)
MCHC RBC AUTO-ENTMCNC: 34.3 G/DL (ref 31–37)
MCV RBC AUTO: 90.7 FL (ref 80–100)
MONOCYTES # BLD AUTO: 0.44 X10(3) UL (ref 0.1–1)
MONOCYTES NFR BLD AUTO: 6.7 %
NEUTROPHILS # BLD AUTO: 3.96 X10 (3) UL (ref 1.5–7.7)
NEUTROPHILS # BLD AUTO: 3.96 X10(3) UL (ref 1.5–7.7)
NEUTROPHILS NFR BLD AUTO: 60.1 %
OSMOLALITY SERPL CALC.SUM OF ELEC: 291 MOSM/KG (ref 275–295)
PLATELET # BLD AUTO: 235 10(3)UL (ref 150–450)
POTASSIUM SERPL-SCNC: 4 MMOL/L (ref 3.5–5.1)
PROT SERPL-MCNC: 7.8 G/DL (ref 5.7–8.2)
RBC # BLD AUTO: 5.08 X10(6)UL (ref 4.3–5.7)
SODIUM SERPL-SCNC: 139 MMOL/L (ref 136–145)
WBC # BLD AUTO: 6.6 X10(3) UL (ref 4–11)

## 2025-05-22 PROCEDURE — 85025 COMPLETE CBC W/AUTO DIFF WBC: CPT

## 2025-05-22 PROCEDURE — 36415 COLL VENOUS BLD VENIPUNCTURE: CPT

## 2025-05-22 PROCEDURE — 80053 COMPREHEN METABOLIC PANEL: CPT

## 2025-05-22 RX ORDER — TIRZEPATIDE 2.5 MG/.5ML
2.5 INJECTION, SOLUTION SUBCUTANEOUS WEEKLY
Qty: 2 ML | Refills: 0 | Status: SHIPPED | OUTPATIENT
Start: 2025-05-22 | End: 2025-06-13

## 2025-05-22 RX ORDER — ALBUTEROL SULFATE AND BUDESONIDE 90; 80 UG/1; UG/1
2 AEROSOL, METERED RESPIRATORY (INHALATION) EVERY 6 HOURS PRN
Qty: 10.7 G | Refills: 2 | Status: SHIPPED | OUTPATIENT
Start: 2025-05-22

## 2025-05-22 NOTE — PATIENT INSTRUCTIONS
VISIT SUMMARY:    During today's visit, we discussed your persistent shortness of breath, wheezing, chronic sinusitis, eye floaters, fatigue, weight gain, and vitamin deficiencies. We reviewed your current treatments and made several recommendations to help manage your symptoms and improve your overall health.    YOUR PLAN:    -OBSTRUCTIVE SLEEP APNEA: Obstructive sleep apnea is a condition where your breathing repeatedly stops and starts during sleep. We recommend continuing with your sleep clinic appointments and focusing on weight loss through diet and exercise. We will also attempt to get Zepbound (Tirzepatide) approved for weight loss and sleep apnea.    -FATIGUE, UNSPECIFIED TYPE: Your persistent fatigue may be related to obstructive sleep apnea and vitamin D deficiency. Please continue with your vitamin D supplementation. We will repeat your blood tests (CBC and CMP) to rule out other causes of fatigue. Also start vitamin B12 supplementation (1000mcg) daily.     -OVERWEIGHT: Being overweight can contribute to various health issues, including sleep apnea and reactive airway disease. We recommend continuing your efforts with diet and exercise. We will also try to get Zepbound (Tirzepatide) approved for weight loss.    -REACTIVE AIRWAY DISEASE: Reactive airway disease involves intermittent wheezing and shortness of breath, often triggered by allergies. We have prescribed a new inhaler with albuterol and budesonide. We will also order pulmonary function tests to further evaluate your condition.    -CHRONIC SINUSITIS: Chronic sinusitis is the long-term inflammation of the sinuses, causing nasal congestion and discharge. We recommend using Flonase nasal spray and performing nasal saline rinses. We will refer you to an ENT specialist for further evaluation.    -ALLERGIC RHINITIS: Allergic rhinitis is an allergic reaction that causes nasal congestion and a runny nose. Please use allergy medications like Zyrtec or  Claritin as needed.    -VITAMIN D DEFICIENCY: Vitamin D deficiency can lead to fatigue and other health issues. Please continue with your vitamin D supplementation.    -GENERAL HEALTH MAINTENANCE: Your LDL cholesterol is slightly elevated, and your vitamin B12 levels are on the low end of normal. We recommend controlling your LDL cholesterol through diet and exercise and starting a vitamin B12 supplement, 1000 mcg daily.    INSTRUCTIONS:    Please follow up in 3 months. Stop at the lab for CBC and CMP tests. Follow up with the ENT specialist after your referral. Complete the pulmonary function testing as ordered. Monitor your response to Zepbound if it is approved.      Health Screening Guidelines, Men Ages 40 to 49   Screening tests and health counseling are a key part of managing your health. A screening test is done to find disorders or diseases in people who don't have any symptoms. Screening tests are not used to diagnose. They are used to find out if more testing is needed. The goal may be to find a disease early so it can be treated with more success. Or the goal may be to find a disease early so you can make lifestyle changes. You may need regular checkups to help you reduce your risk of disease.   Below are guidelines for men ages 40 to 49. Talk with your healthcare provider. Make sure you’re up-to-date on what you need.   We understand gender is a spectrum. We may use gendered terms to talk about anatomy and health risk. Please use this information in a way that works best for you and your healthcare provider as you talk about your care.   Screening Who needs it How often   Alcohol misuse All men in this age group  At routine exams   Blood pressure All men in this age group  Once a year if your blood pressure is normal. Normal blood pressure is less than 120/80 mm Hg. If your blood pressure is higher than this, follow the advice of your healthcare provider.    Depression All men in this age group  At  routine exams   Type 2 diabetes or prediabetes  All men ages 35 to 70 with no symptoms who are overweight or obese.  At least every 3 years (yearly if blood sugar has begun to rise)    BMI (body mass index) All men in this age group Every year, to help find out if you are at a healthy weight for your height    Type 2 diabetes All men with prediabetes  Every year   Hepatitis C All adults ages 18 and older at least once in a lifetime.  Talk with your healthcare provider about your risk factors and how often to have hepatitis C screening.    High cholesterol or triglycerides  All men in this age group  At least every 5 years; or at least every 1 to 2 years for men ages 45 to 65. Expert groups vary in their advice. Talk with your healthcare provider about your risk and how often you should be tested.    HIV All men in this age group  At routine exams   Obesity All men in this age group  At routine exams   Prostate cancer Starting at age 45, talk with your healthcare provider about the risks and benefits of these:       A digital rectal exam  Prostate-specific antigen screening     At routine exams   Colorectal cancer  Men age 45 and older at average risk Talk with your healthcare provider about which test below is right for you:   Colonoscopy every 10 years  Flexible sigmoidoscopy every 5 years or every 10 with yearly fecal immunochemical test (FIT) stool test  CT colonography (virtual colonoscopy) every 5 years  Yearly fecal occult blood test  Yearly FIT  FIT-DNA test (also referred to as stool DNA test) every 3 years  If you have a test that is not a colonoscopy and have an abnormal test result, you will need a colonoscopy.   You may need to be screened more or less often. This is based on personal or family health history. Talk with your healthcare provider.    Syphilis Men who are at higher risk for infection. Talk with your healthcare provider.  At routine exams   Tuberculosis Men who are at higher risk for  infection. Talk with your healthcare provider.  Talk with your healthcare provider    Vision All men in this age group  Baseline screening at age 40. Talk with your healthcare provider about how often to have vision exams.    Health counseling Who needs it How often   Diet and exercise Men who are overweight or obese  When diagnosed, and then at routine exams    Sexually transmitted infection prevention  Men who are at higher risk of infection. Talk with your healthcare provider.  At routine exams   Use of tobacco and health effects it can cause  All men in this age group  Every exam   Jair last reviewed this educational content on 8/1/2024  This information is for informational purposes only. This is not intended to be a substitute for professional medical advice, diagnosis, or treatment. Always seek the advice and follow the directions from your physician or other qualified health care provider.  © 2081-7538 [employer]. All rights reserved. This information is not intended as a substitute for professional medical care. Always follow your healthcare professional's instructions.
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
## 2025-05-27 ENCOUNTER — TELEPHONE (OUTPATIENT)
Dept: PHYSICAL THERAPY | Facility: HOSPITAL | Age: 40
End: 2025-05-27

## 2025-05-27 ENCOUNTER — APPOINTMENT (OUTPATIENT)
Dept: PHYSICAL THERAPY | Age: 40
End: 2025-05-27
Attending: FAMILY MEDICINE
Payer: COMMERCIAL

## 2025-05-29 ENCOUNTER — TELEPHONE (OUTPATIENT)
Dept: PHYSICAL THERAPY | Facility: HOSPITAL | Age: 40
End: 2025-05-29

## 2025-05-30 ENCOUNTER — OFFICE VISIT (OUTPATIENT)
Dept: PHYSICAL THERAPY | Age: 40
End: 2025-05-30
Attending: FAMILY MEDICINE
Payer: COMMERCIAL

## 2025-05-30 PROCEDURE — 97110 THERAPEUTIC EXERCISES: CPT

## 2025-05-30 PROCEDURE — 97140 MANUAL THERAPY 1/> REGIONS: CPT

## 2025-05-30 NOTE — PROGRESS NOTES
Patient: Paddy Piña JrCathie (40 year old, male) Referring Provider:  Insurance:   Diagnosis: Left Sided L3 Lumbar Paraesthesia Bear Solomon Patel  BCBS OUT OF STATE   Date of Surgery: No data recorded Next MD visit:  N/A   Precautions:  None No data recorded Referral Information:    Date of Evaluation: Req: 0, Auth: 0, Exp:     05/15/25 POC Auth Visits:          Today's Date   5/30/2025    Subjective  Feeling some improvement overall.  Less pain in the central lumbar spine.       Pain: 2/10     Objective  Treatment per log below with greater focus on left hip         Assessment  Responded well with less pain and increased stability through the TrA.    Goals (to be met in 12 visits)   1. Improve upon GERALDINE assessment > 11% from INE to DC.  2. Patient will be aware of postural limitations and be able to correct them independently.    3. Patient will have an increase in spinal mobility to >75% in order to return to improved tolerance for sitting, sustained ambulation, and exercise.    4. Patient will have an increase in core strength to assist with returning to sustained posturing.   5. Patient will demonstrate an increase in MLT of  the hamstrings and hip flexor in order to return to more normalized ambulation and exercise.               Plan  Assess response and progress as tolerated.    Treatment Last 4 Visits  Treatment Day: 3       5/14/2025 5/19/2025 5/30/2025   Spine Treatment   Therapeutic Exercise  Elliptical L5 x 5 min  KTOS 30 sec x 3  Open book stretch 10 sec x 3  Closed chain hamstring stretch x 10  Closed chain hip flexor stretch 10 sec x 10  Cable Column bilateral shoulder extension x 20 60#  Cable column unilateral rows 36# x 20  Cable column side stepping gait 36# x 10   Elliptical L5 x 5 min  KTOS 30 sec x 3  Open book stretch 10 sec x 3  Closed chain hamstring stretch x 10  Closed chain hip flexor stretch 10 sec x 10  Cable Column bilateral shoulder extension x 20 60#  Cable column unilateral rows  36# x 20  Cable column side stepping gait 36# x 10       Neuro Re-Education  TrA recruitment in neutral - rhythmic stabilization x 20 reps in neutral for flexion and bilateral rotation TrA recruitment in neutral - rhythmic stabilization x 20 reps in neutral for flexion and bilateral rotation   Manual Therapy  Hip IR and Flexion manipulation,   Lumbar Roll manipulation - left side   Hip IR and Flexion manipulation,   Lumbar Roll manipulation - left side     Therapeutic Exercise Minutes 10 25 25   Manual Therapy Minutes  15 15   Evaluation Minutes 25     Total Time Of Timed Procedures 10 40 40   Total Time Of Service-Based Procedures 25 0 0   Total Treatment Time 35 40 40   HEP LE flexibility and postural awareness          HEP  LE flexibility and postural awareness    Charges  2 TherEx; 1 Manual PT

## 2025-06-02 ENCOUNTER — APPOINTMENT (OUTPATIENT)
Dept: PHYSICAL THERAPY | Age: 40
End: 2025-06-02
Attending: FAMILY MEDICINE
Payer: COMMERCIAL

## 2025-06-02 ENCOUNTER — TELEPHONE (OUTPATIENT)
Dept: PHYSICAL THERAPY | Age: 40
End: 2025-06-02

## 2025-06-04 ENCOUNTER — OFFICE VISIT (OUTPATIENT)
Age: 40
End: 2025-06-04
Attending: FAMILY MEDICINE
Payer: COMMERCIAL

## 2025-06-04 ENCOUNTER — NURSE ONLY (OUTPATIENT)
Age: 40
End: 2025-06-04
Attending: FAMILY MEDICINE
Payer: COMMERCIAL

## 2025-06-04 DIAGNOSIS — Z80.3 FAMILY HISTORY OF BREAST CANCER: ICD-10-CM

## 2025-06-04 DIAGNOSIS — Z80.42 FAMILY HISTORY OF PROSTATE CANCER: Primary | ICD-10-CM

## 2025-06-04 NOTE — PROGRESS NOTES
Referring Provider:  Bear Gray MD    Reason for Referral:  Paddy PiñaJr was referred for genetic counseling because of a family history of cancer. Mr. Piña is a 40 year-old man of [place] and [place] descent who has no personal history of cancer. Mr. 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 Mr. Piña denies any personal history of known cardiac or renal malformations. Mr. Piña has not had a colonoscopy.    Social History:  Mr. Piña was seen today by himself. Mr. Piña lives in [place].    Family History:   A three generation pedigree was obtained.      Mr. Piña has three daughters ages two, 14, and 23, and two sons, ages 21 and 23.     Mr. Piña has two sisters, one younger and one older, and no brothers.      Mr. Piña's mother is approximately 67 years-old and has not had cancer; she is non-compliant with routine medical care. Mr. Piña's mother had three brothers and two sisters, all of whom  from cancer. One of Mr. Piña's maternal aunts  in her 40s from breast cancer. One of Mr. Piña's maternal uncles  at an unspecified older age from prostate cancer. Mr. Piña's other maternal aunt and two other maternal uncles  from unknown cancers at unspecified older ages. Mr. Piña's maternal grandparents  in their 80s from unspecified causes.     Mr. Piña's father is 67 years-old and has not had cancer; he is non-compliant with routine medical care. Mr. Piña's father has two sisters and no brothers. Mr. Piña's paternal aunts have not had cancer. Mr. Piña's paternal grandmother is living in her 80s and is being treated for an unspecified cancer on her forehead. Mr. Piña's paternal grandfather  in his 90s from esophageal cancer.     Please see the pedigree for additional family history information.     Counseling:   The following information was discussed with  Mr. Piña.    Genetics of Cancer:  The majority of cancers are sporadic whereas approximately 10-30% of cases of cancer cases are attributed to familial factors such as unidentified low penetrance genes in the family or shared environmental factors.  Approximately 5-10% of cancers are related to a hereditary cancer syndrome.  Signs of a hereditary cancer syndrome include some rare cancers, common cancers occurring at unusually young ages, multiple primary cancers in the some individual, or the same type of cancer or related cancers (e.g., breast and ovarian, colorectal and endometrial) in three or more individuals in the same lineage.      Risk Assessment:   Mr. Piña meets NCCN Guidelines testing criteria for prostate cancer susceptibility genes based on his reported family history of metastatic prostate cancer in his maternal uncle and early-onset breast cancer in his maternal aunt. Approximately 6-12% of men with metastatic prostate cancer, unselected for family history, have inherited mutations in a DNA repair gene.      Risk assessment to estimate the chance of Mr. Piña harboring a BRCA1/2 pathogenic variant was done by using the [place] II Model. Based on this model, Ms. Mr. Piña's risk of carrying a BRCA1/2 pathogenic variant is estimated to be 3%. It is important to note that all prediction models have limitations and medical management should be based on clinical judgment, and personal and family history. In addition, there are no prediction models or testing criteria for moderate penetrance cancer predisposition genes such as RIGO and CHEK2. I recommend that testing be performed as part of a multigene panel.     Genetic Testing (Panel):  The pros, cons, and limitations of genetic testing were discussed including the potential implications of test results on clinical management.     If a pathogenic variant is not identified (negative result), it is still possible that Mr. Piña has a pathogenic variant  in one of these genes that was not detected by the genetic test, or that the family is dealing with a hereditary cancer syndrome involving a different gene. It is also possible that Mr. Piña's relatives have a pathogenic variant in one of these genes that Mr. Piña did not inherit. In this scenario, options for cancer screening/management should be determined according to personal and family histories and should be discussed with a physician.      A variant of uncertain significance is a DNA change that may or may not alter the function of the gene; therefore, it is usually not possible to determine if the gene variant is responsible for an individual's increased cancer risk.     If Mr. Piña is found to carry a pathogenic variant in a cancer predisposition gene, he is at significantly increased risk for various cancers. The magnitude of these risks, and the cancers for which he is at increased risk would depend on the gene involved. It was also explained that for some of the genes for which testing is available, the associated cancer risks have yet to be determined and medical management recommendations may not yet be available for individuals with pathogenic variants in these genes. If he were to test positive for a pathogenic variant, his children and siblings would each have a 50% chance of carrying the same variant. At-risk adults (>18) would have the option of pursuing targeted genetic testing to clarify their cancer risks. Genetic test results have implications for the entire biological family. Thus, it is recommended that he share her genetic test results with his biological family members so that they may have their risk assessed.     Genetic Information Non-Discrimination Act:  The legal protections of the Genetic Information Nondiscrimination Act (BALJINDER) for health insurance and employment were discussed.  BALJINDER does not provide protection for life insurance, disability or long-term care  insurance.    Summary and Plan:  Mr. Piña was referred for genetic counseling because of a family history of cancer. His reported maternal family history is suspicious for a hereditary cancer syndrome. Genetic testing on Mr. Piña for prostate cancer susceptibility genes is indicated.      At the conclusion of the counseling session Mr. Piña decided to proceed with genetic testing. Written consent was obtained. Blood and paperwork were sent to [facility] for their [facility] Hereditary Prostae Cancer panel. I anticipate that Mr. Piña's results will be available within 2-3 weeks and will call him with the results.  Results will also be communicated to Dr. Gray.    Approximately 50 minutes was spent in coordination of Mr. Piña's care.

## 2025-06-06 ENCOUNTER — OFFICE VISIT (OUTPATIENT)
Facility: LOCATION | Age: 40
End: 2025-06-06
Payer: COMMERCIAL

## 2025-06-06 DIAGNOSIS — M25.571 SINUS TARSITIS OF RIGHT FOOT: ICD-10-CM

## 2025-06-06 DIAGNOSIS — Z89.421 HISTORY OF AMPUTATION OF LESSER TOE OF RIGHT FOOT (HCC): ICD-10-CM

## 2025-06-06 DIAGNOSIS — M19.071 ARTHRITIS OF RIGHT SUBTALAR JOINT: ICD-10-CM

## 2025-06-06 DIAGNOSIS — M79.89 PALPABLE MASS OF SOFT TISSUE OF FOOT: ICD-10-CM

## 2025-06-06 DIAGNOSIS — M79.671 RIGHT FOOT PAIN: ICD-10-CM

## 2025-06-06 DIAGNOSIS — M19.071 ARTHRITIS OF RIGHT MIDFOOT: Primary | ICD-10-CM

## 2025-06-06 DIAGNOSIS — M20.5X2 HALLUX LIMITUS OF LEFT FOOT: ICD-10-CM

## 2025-06-06 DIAGNOSIS — M20.5X1 HALLUX LIMITUS OF RIGHT FOOT: ICD-10-CM

## 2025-06-06 PROCEDURE — 99213 OFFICE O/P EST LOW 20 MIN: CPT | Performed by: PODIATRIST

## 2025-06-06 NOTE — PROGRESS NOTES
[facility]  Progress Note      Paddy Piña Jr. is a 40 year old male.   Chief Complaint   Patient presents with    Follow - Up     Follow up on mimi foot pain  Patient is doing better, steroids helped         HPI:     The following individual(s) verbally consented to be recorded using ambient AI listening technology and understand that they can each withdraw their consent to this listening technology at any point by asking the clinician to turn off or pause the recording:      History of Present Illness  Paddy Piña Jr. is a 40 year old male who presents for follow-up on bilateral feet.    He experiences pain and swelling in the midfoot area of the right foot, primarily located on the top part of the foot. The symptoms have been gradually improving after completing a course of Medrol Dosepak. He describes the pain as 'not as bad anymore' with a slow but steady improvement since starting the medication.    His occupation requires him to be on his feet all day, which may contribute to his symptoms. He is not currently taking any anti-inflammatory medications.      Allergies: Dog epithelium and Molds & smuts   Current Medications[1]   Past Medical History[2]   Past Surgical History[3]   Family History[4]   Social Hx on file[5]        REVIEW OF SYSTEMS:     10 point ROS completed and was negative unless stated in HPI.      EXAM:     GENERAL: well developed, well nourished, in no apparent distress  EXTREMITIES:  1. Integument: Skin appears moist, warm, and supple. There are no color changes. No open lesions. No macerations. No Hyperkeratotic lesions.  Healed cicatrix with syndactylization of remaining right second digit tissue to the right third digit.  No signs of infection or complication.  There is a subtle but palpable soft tissue mass that is semimobile over the dorsal aspect of the right rear foot.  There is no current signs of infection.  2. Vascular: Dorsalis pedis 2/4 bilateral and posterior tibial  pulses 2/4 bilateral, capillary refill normal.  3. Neurological: Gross sensation intact via light touch bilaterally.  Normal sharp/dull sensation  4. Musculoskeletal: History of right second digit amputation with soft tissue envelope still within the foot, syndactyly to the right third digit.  Patient does have decreased range of motion of bilateral first MPJs with minimal pain at end range of dorsiflexion.  Mild pain with palpation to second tarsometatarsal joint with palpable exostosis and soft tissue mass present, right foot.  Patient does have adequate inversion of subtalar joint of the right foot, but limitations with eversion and pain improved.  There is also improved pain with palpation within the sinus tarsi of the right foot.  No pain with palpation to tarsometatarsal joints of the left foot and no pain to the subtalar joint or left foot.     XR FOOT, COMPLETE (MIN 3 VIEWS), BILAT (CPT=73630-50)  Result Date: 4/10/2025  CONCLUSION:  1. Prior bony amputation right 2nd digit. 2. Bilateral osteoarthritis at 1st metatarsophalangeal joints.   LOCATION:  [facility]   Dictated by (CST): Chivo Salinas MD on 4/10/2025 at 3:50 PM     Finalized by (CST): Chivo Salinas MD on 4/10/2025 at 3:52 PM           ASSESSMENT AND PLAN:   Diagnoses and all orders for this visit:    Arthritis of right midfoot    Palpable mass of soft tissue of foot    Hallux limitus of right foot    Hallux limitus of left foot    Sinus tarsitis of right foot    Arthritis of right subtalar joint    Right foot pain    History of amputation of lesser toe of right foot ([facility])        Plan:   -Patient was seen and evaluated today in clinic.  Chart history reviewed.    Assessment & Plan  Arthritis of right midfoot  Chronic arthritis with improved pain post-Medrol Dosepak. Persistent swelling and tenderness. Joint injection discussed for persistent symptoms.  - Provide offloading pads to reduce pressure.  - Consider meloxicam for increased pain; he will notify  if needed.  - Consider joint injection if symptoms persist.  - Continue with supportive shoe gear and avoid barefoot walking    Ganglion cyst of right foot  Suspected ganglion cyst in right midfoot, not currently problematic other than mild discomfort with palpation. Discussed management options and recurrence risk post-surgery.  - Apply warm compresses.  - Use offloading pads to alleviate pressure.  - Consider ultrasound if soft tissue mass worsens to evaluate for fluid collection.  - Discuss potential aspiration and steroid injection if ultrasound confirms fluid collection.  - Consider surgical excision if debilitating, acknowledging recurrence risk.      -All of the patient's questions and concerns were addressed.  They indicated their understanding of these issues and agrees to the plan.    Time spent reviewing pertinent information from patient's chart, reviewing any pertinent imaging, obtaining history and physical exam, discussing and mutually agreeing on a treatment plan, and documenting encounter: 20 minutes    RTC as needed    Mich Gutierrez DPM, AACFAS        6/6/2025    [address] [address]   [email or web address]            Dragon speech recognition software was used to prepare this note.  Errors in word recognition may occur.  Please contact me with any questions/concerns with this note.        [1]   Current Outpatient Medications   Medication Sig Dispense Refill    Albuterol-Budesonide (AIRSUPRA) 90-80 MCG/ACT Inhalation Aerosol Inhale 2 puffs into the lungs every 6 (six) hours as needed (shortness of breath/wheezing.). 10.7 g 2    Tirzepatide-Weight Management (ZEPBOUND) 2.5 MG/0.5ML Subcutaneous Solution Auto-injector Inject 2.5 mg into the skin once a week for 4 doses. 2 mL 0    methylPREDNISolone 4 MG Oral Tablet Therapy Pack Take per package insert (instructions). (Patient not taking: Reported on 6/6/2025) 21 tablet 0     ergocalciferol 1.25 MG (11014 UT) Oral Cap Take 1 capsule (50,000 Units total) by mouth once a week. 4 capsule 1    pregabalin 75 MG Oral Cap Take 1 capsule (75 mg total) by mouth 2 (two) times daily. 60 capsule 2    albuterol (2.5 MG/3ML) 0.083% Inhalation Nebu Soln  (Patient not taking: Reported on 5/22/2025)      fluticasone propionate 50 MCG/ACT Nasal Suspension 2 sprays by Nasal route daily. 16 g 0   [2]   Past Medical History:   NIYA (obstructive sleep apnea)    AHI-36    Sepsis (HCC)   [3]   Past Surgical History:  Procedure Laterality Date    Toe surgery Right    [4]   Family History  Problem Relation Age of Onset    Cancer Paternal Grandmother     Cancer Paternal Grandfather    [5]   Social History  Socioeconomic History    Marital status:    Tobacco Use    Smoking status: Never    Smokeless tobacco: Never   Vaping Use    Vaping status: Never Used   Substance and Sexual Activity    Alcohol use: Never    Drug use: Never

## 2025-06-09 ENCOUNTER — TELEPHONE (OUTPATIENT)
Dept: SLEEP CENTER | Age: 40
End: 2025-06-09

## 2025-06-09 DIAGNOSIS — R06.83 SNORING: ICD-10-CM

## 2025-06-09 DIAGNOSIS — G47.33 OSA (OBSTRUCTIVE SLEEP APNEA): Primary | ICD-10-CM

## 2025-06-09 DIAGNOSIS — G47.30 SLEEP HYPOPNEA: ICD-10-CM

## 2025-06-18 ENCOUNTER — OFFICE VISIT (OUTPATIENT)
Dept: PHYSICAL THERAPY | Age: 40
End: 2025-06-18
Attending: FAMILY MEDICINE
Payer: COMMERCIAL

## 2025-06-18 PROCEDURE — 97140 MANUAL THERAPY 1/> REGIONS: CPT

## 2025-06-18 PROCEDURE — 97112 NEUROMUSCULAR REEDUCATION: CPT

## 2025-06-18 PROCEDURE — 97110 THERAPEUTIC EXERCISES: CPT

## 2025-06-19 NOTE — PROGRESS NOTES
Patient: Paddy Piña  (40 year old, male) Referring Provider:  Insurance:   Diagnosis: Left Sided L3 Lumbar Paraesthesia Bear Gray  BCBS OUT OF STATE   Date of Surgery: No data recorded Next MD visit:  N/A   Precautions:  None No data recorded Referral Information:    Date of Evaluation: Req: 0, Auth: 0, Exp:     05/15/25 POC Auth Visits:          Today's Date   6/18/2025    Subjective  States he continues to get the left thigh paraesthesia, but the exercises are able to alleviate it       Pain: 2/10     Objective  Treatment per log below       Assessment  Responded well with less pain and increased stability with exercise completion    Goals (to be met in 12 visits)   1. Improve upon GERALDINE assessment > 11% from INE to DC.  2. Patient will be aware of postural limitations and be able to correct them independently.    3. Patient will have an increase in spinal mobility to >75% in order to return to improved tolerance for sitting, sustained ambulation, and exercise.    4. Patient will have an increase in core strength to assist with returning to sustained posturing.   5. Patient will demonstrate an increase in MLT of  the hamstrings and hip flexor in order to return to more normalized ambulation and exercise.                   Plan  Assess response and progress as tolerated.    Treatment Last 4 Visits  Treatment Day: 4       5/14/2025 5/19/2025 5/30/2025 6/18/2025   Spine Treatment   Therapeutic Exercise  Elliptical L5 x 5 min  KTOS 30 sec x 3  Open book stretch 10 sec x 3  Closed chain hamstring stretch x 10  Closed chain hip flexor stretch 10 sec x 10  Cable Column bilateral shoulder extension x 20 60#  Cable column unilateral rows 36# x 20  Cable column side stepping gait 36# x 10   Elliptical L5 x 5 min  KTOS 30 sec x 3  Open book stretch 10 sec x 3  Closed chain hamstring stretch x 10  Closed chain hip flexor stretch 10 sec x 10  Cable Column bilateral shoulder extension x 20 60#  Cable column  unilateral rows 36# x 20  Cable column side stepping gait 36# x 10     Elliptical L5 x 5 min  KTOS 30 sec x 3  Open book stretch 10 sec x 3  Closed chain hamstring stretch x 10  Closed chain hip flexor stretch 10 sec x 10  Cable Column bilateral shoulder extension x 20 60#  Cable column unilateral rows 36# x 20  Cable column side stepping gait 36# x 10     Neuro Re-Education  TrA recruitment in neutral - rhythmic stabilization x 20 reps in neutral for flexion and bilateral rotation TrA recruitment in neutral - rhythmic stabilization x 20 reps in neutral for flexion and bilateral rotation TrA recruitment in neutral - rhythmic stabilization x 20 reps in neutral for flexion and bilateral rotation   Manual Therapy  Hip IR and Flexion manipulation,   Lumbar Roll manipulation - left side   Hip IR and Flexion manipulation,   Lumbar Roll manipulation - left side   Hip IR and Flexion manipulation,   Lumbar Roll manipulation - left side     Therapeutic Exercise Minutes 10 25 25 25   Neuro Re-Educ Minutes    10   Manual Therapy Minutes  15 15 10   Evaluation Minutes 25      Total Time Of Timed Procedures 10 40 40 45   Total Time Of Service-Based Procedures 25 0 0 0   Total Treatment Time 35 40 40 45   HEP LE flexibility and postural awareness           HEP  LE flexibility and postural awareness    Charges  TherEx 2; Manual PT 1; Neuro Lisa 1

## 2025-06-20 ENCOUNTER — OFFICE VISIT (OUTPATIENT)
Dept: SLEEP CENTER | Age: 40
End: 2025-06-20
Attending: INTERNAL MEDICINE
Payer: COMMERCIAL

## 2025-06-20 DIAGNOSIS — G47.33 OSA (OBSTRUCTIVE SLEEP APNEA): ICD-10-CM

## 2025-06-20 DIAGNOSIS — R06.83 SNORING: ICD-10-CM

## 2025-06-20 DIAGNOSIS — G47.30 SLEEP HYPOPNEA: ICD-10-CM

## 2025-06-20 PROCEDURE — 95811 POLYSOM 6/>YRS CPAP 4/> PARM: CPT

## 2025-06-24 ENCOUNTER — SLEEP STUDY (OUTPATIENT)
Facility: CLINIC | Age: 40
End: 2025-06-24
Payer: COMMERCIAL

## 2025-06-24 DIAGNOSIS — G47.30 SLEEP APNEA, UNSPECIFIED TYPE: Primary | ICD-10-CM

## 2025-06-24 PROCEDURE — 95810 POLYSOM 6/> YRS 4/> PARAM: CPT | Performed by: INTERNAL MEDICINE

## 2025-07-08 ENCOUNTER — OFFICE VISIT (OUTPATIENT)
Dept: PHYSICAL THERAPY | Age: 40
End: 2025-07-08
Attending: FAMILY MEDICINE
Payer: COMMERCIAL

## 2025-07-08 PROCEDURE — 97110 THERAPEUTIC EXERCISES: CPT

## 2025-07-08 PROCEDURE — 97140 MANUAL THERAPY 1/> REGIONS: CPT

## 2025-07-08 NOTE — PROGRESS NOTES
Patient: Paddy Piña Jr. (40 year old, male) Referring Provider:  Insurance:   Diagnosis: Left Sided L3 Lumbar Paraesthesia Bear Gray  BCBS OUT OF STATE   Date of Surgery: No data recorded Next MD visit:  N/A   Precautions:  None No data recorded Referral Information:    Date of Evaluation: Req: 0, Auth: 0, Exp:     05/15/25 POC Auth Visits:          Today's Date   7/8/2025    Subjective  States that the patch of numbness has decreased and he no longer gets the \"shock\" symptoms into the leg       Pain: 2/10     Objective  Treatment per log below.  Reviewed HEP.  Full lumbar motion for all planes of motion.         Assessment  Tolerated treatment well.  Has met all goals and is independnet with HEP    Goals (to be met in 12 visits)   1. Improve upon GERALDINE assessment > 11% from INE to DC.  2. Patient will be aware of postural limitations and be able to correct them independently.    3. Patient will have an increase in spinal mobility to >75% in order to return to improved tolerance for sitting, sustained ambulation, and exercise.    4. Patient will have an increase in core strength to assist with returning to sustained posturing.   5. Patient will demonstrate an increase in MLT of  the hamstrings and hip flexor in order to return to more normalized ambulation and exercise.                      Post LEFS Score  Post LEFS Score: 95 % (7/8/2025 11:59 AM)    5 % improvement    Plan: Discontinue skilled Physical Therapy meeting 100% of goals as established at initial evaluation    Patient/Family/Caregiver was advised of these findings, precautions, and treatment options and has agreed to actively participate in planning and for this course of care.    Thank you for your referral. If you have any questions, please contact me at Dept: [phone].    Sincerely,  Electronically signed by therapist: Raphael No PT     Physician's certification required:  No  Please co-sign or sign and return this letter via fax  as soon as possible to [phone].   I certify the need for these services furnished under this plan of treatment and while under my care.    X___________________________________________________ Date____________________    Certification From: 7/8/2025  To:10/6/2025       Treatment Last 4 Visits  Treatment Day: 5 5/19/2025 5/30/2025 6/18/2025 7/8/2025   Spine Treatment   Therapeutic Exercise Elliptical L5 x 5 min  KTOS 30 sec x 3  Open book stretch 10 sec x 3  Closed chain hamstring stretch x 10  Closed chain hip flexor stretch 10 sec x 10  Cable Column bilateral shoulder extension x 20 60#  Cable column unilateral rows 36# x 20  Cable column side stepping gait 36# x 10   Elliptical L5 x 5 min  KTOS 30 sec x 3  Open book stretch 10 sec x 3  Closed chain hamstring stretch x 10  Closed chain hip flexor stretch 10 sec x 10  Cable Column bilateral shoulder extension x 20 60#  Cable column unilateral rows 36# x 20  Cable column side stepping gait 36# x 10     Elliptical L5 x 5 min  KTOS 30 sec x 3  Open book stretch 10 sec x 3  Closed chain hamstring stretch x 10  Closed chain hip flexor stretch 10 sec x 10  Cable Column bilateral shoulder extension x 20 60#  Cable column unilateral rows 36# x 20  Cable column side stepping gait 36# x 10   KTOS 30 sec x 3  Open book stretch 10 sec x 3  Closed chain hamstring stretch x 10  Closed chain hip flexor stretch 10 sec x 10  Cable Column bilateral shoulder extension x 20 60#  Cable column unilateral rows 36# x 20  Cable column side stepping gait 36# x 10     Neuro Re-Education TrA recruitment in neutral - rhythmic stabilization x 20 reps in neutral for flexion and bilateral rotation TrA recruitment in neutral - rhythmic stabilization x 20 reps in neutral for flexion and bilateral rotation TrA recruitment in neutral - rhythmic stabilization x 20 reps in neutral for flexion and bilateral rotation    Manual Therapy Hip IR and Flexion manipulation,   Lumbar Roll manipulation -  left side   Hip IR and Flexion manipulation,   Lumbar Roll manipulation - left side   Hip IR and Flexion manipulation,   Lumbar Roll manipulation - left side      Therapeutic Exercise Minutes 25 25 25 25   Neuro Re-Educ Minutes   10    Manual Therapy Minutes 15 15 10 10   Total Time Of Timed Procedures 40 40 45 35   Total Time Of Service-Based Procedures 0 0 0 0   Total Treatment Time 40 40 45 35   HEP    Reviewed HEP for LE and trunk flexibility        HEP  Reviewed HEP for LE and trunk flexibility    Charges  TherEx 2; Manual PT 1

## 2025-07-14 ENCOUNTER — OFFICE VISIT (OUTPATIENT)
Facility: LOCATION | Age: 40
End: 2025-07-14
Payer: COMMERCIAL

## 2025-07-14 DIAGNOSIS — R06.02 SHORTNESS OF BREATH: Primary | ICD-10-CM

## 2025-07-14 DIAGNOSIS — J35.1 TONSILLAR HYPERTROPHY: ICD-10-CM

## 2025-07-14 PROCEDURE — 99203 OFFICE O/P NEW LOW 30 MIN: CPT | Performed by: OTOLARYNGOLOGY

## 2025-07-14 PROCEDURE — 31575 DIAGNOSTIC LARYNGOSCOPY: CPT | Performed by: OTOLARYNGOLOGY

## 2025-07-14 NOTE — PROGRESS NOTES
Otolaryngology Consultation Note     Reason for consultation: shortness of breath  Consulting physician and service: Bear Gray MD      HPI: 41 y/o M presents with difficulty breathing for the past year. Primarily occurs after speaking for long periods of time. No difficulty breathing at rest. No dysphagia, odynophagia or changes in voice/hoarseness. No h/o tobacco use, never smoker. Was told he had enlarged tonsils in the past. No h/o COPD/asthma. Currently taking AIRSUPRA inhaler prn, only used once since May. No nasal obstruction/congestion. No clear/purulent rhinorrhea. No other complaints.      Past Medical History: Past Medical History[1]     Past Surgical History: Past Surgical History[2]     Medication: Scheduled Meds:Scheduled Medications[3]  Continuous Infusions:Medication Infusions[4]  PRN Meds:.PRN Medications[5]     Allergies:  Allergies[6]  Pertinent Family History: Family History[7]     Pertinent Social History:   Social History     Socioeconomic History    Marital status:      Spouse name: Not on file    Number of children: Not on file    Years of education: Not on file    Highest education level: Not on file   Occupational History    Not on file   Tobacco Use    Smoking status: Never    Smokeless tobacco: Never   Vaping Use    Vaping status: Never Used   Substance and Sexual Activity    Alcohol use: Never    Drug use: Never    Sexual activity: Not on file   Other Topics Concern    Not on file   Social History Narrative    Not on file     Social Drivers of Health     Food Insecurity: No Food Insecurity (4/10/2025)    NCSS - Food Insecurity     Worried About Running Out of Food in the Last Year: No     Ran Out of Food in the Last Year: No   Transportation Needs: No Transportation Needs (4/10/2025)    NCSS - Transportation     Lack of Transportation: No   Stress: Not on file   Housing Stability: Not At Risk (4/10/2025)    NCSS - Housing/Utilities     Has Housing: Yes     Worried About  Losing Housing: No     Unable to Get Utilities: No        Review of Systems:  Constitutional: Negative.  HENT: see above  Eyes: Negative.  Respiratory: Negative.  Cardiovascular: Negative.  Gastrointestinal: Negative.  Musculoskeletal: Negative.  Skin: Negative.  Renal: Negative  Endocrine: Negative  Psychiatric/Behavioral: Negative.     Physical Examination:  Vitals: There were no vitals taken for this visit.     General: Breathing comfortably on room air while sitting up. Able to communicate verbally. Voice normal. Normal appearing body habitus.     Musculoskeletal: Head: Atraumatic and normocephalic.     Neck: Full ROM and able to extend without issues     Ears: External auditory canals clear with no evidence of significant cerumen or stenosis. Tympanic membranes visible with no evidence of retraction or perforation. No evidence of middle ear effusion bilaterally.     Nose: No sinus tenderness bilaterally upon palpation. No obvious nasal deformity. No masses, rhinorrhea, epistaxis. Nasal septum, mucosa, and turbinates appear normal.     Mouth/Throat: Salivary glands appear normal with no evidence of pain or mass. No masses or lesions noted within the oral mucosa, hard and soft palates, tongue, tonsils and posterior pharynx. Able to tolerate secretions. Oral cavity and oropharynx widely patent. Tonsils 2-3 plus and symmetric. Posterior pharyngeal walls appear normal. Thyroid non tender to palpation without evidence of mass or nodules.     Eyes: Extraocular movements intact and pupils equally reactive to light stimulus. No spontaneous or gaze-evoked nystagmus. No proptosis or ecchymosis. VFI.     Lymphatic: No significant cervical lymphadenopathy noted.     Neuro: CN 7 intact with symmetric mobility and strength. No loss of facial sensation.     Skin: Dry, normal turgor, normal color.     Psych: Alert and oriented to person/place/time. Normal affect, amiable     Significant laboratory values: n/a     Imaging: n/a      Procedures:   Otolaryngology Procedure     Patient name: Paddy Piña Jr.     YOB: 1985     Procedure: Flexible Fiberoptic Laryngoscopy     Indication: shortness of breath     Anesthesia: Topical 1% lidocaine with oxymetazoline     Surgeon: Elena Cruz MD     Procedure detail: Benefits and risks discussed with patient, which include bleeding, pain, infection, damage to surrounding structures. Agreement and informed consent obtained from patient. Patient, while sitting up, was anesthetized with topical anesthetic. This was allowed to sit for 5 minutes. A lubricated flexible fiberoptic laryngoscope was inserted through the nasal cavity to the level of the larynx with findings as follows: No masses or lesions in nasal cavities, nasopharynx, oropharynx, hypopharynx, and larynx. Omega-shaped epiglottis; TVF mobile/symmetric bilaterally. Airway widely patent. Scope was then removed and patient tolerated to procedure well without complication.     Dispo: Patient instructed to follow up as instructed within assessment and plan portion of this notation.         Assessment/Plan:     Shortness of breath: no pathology appreciated on FFL today, unlikely secondary to upper airway etiology. Advised patient to follow up with Pulmonology for further evaluation/treatment. Will defer referral to PCP.   Tonsil hypertrophy: no indication for tonsillectomy at this time     Dr. Bear Gray MD, thank you for involving me in this patient's care. Please contact me with further questions or concerns.    Elena Cruz MD         [1]   Past Medical History:   NIYA (obstructive sleep apnea)    [id number]    Sepsis (HCC)   [2]   Past Surgical History:  Procedure Laterality Date    Toe surgery Right    [3] [4] [5] [6]   Allergies  Allergen Reactions    Dog Epithelium UNKNOWN    Molds & Smuts UNKNOWN   [7]   Family History  Problem Relation Age of Onset    Cancer Paternal Grandmother     Cancer Paternal Grandfather